# Patient Record
Sex: MALE | Race: WHITE | NOT HISPANIC OR LATINO | Employment: FULL TIME | URBAN - METROPOLITAN AREA
[De-identification: names, ages, dates, MRNs, and addresses within clinical notes are randomized per-mention and may not be internally consistent; named-entity substitution may affect disease eponyms.]

---

## 2024-07-07 ENCOUNTER — HOSPITAL ENCOUNTER (EMERGENCY)
Age: 49
Discharge: HOME | End: 2024-07-07
Attending: EMERGENCY MEDICINE

## 2024-07-07 ENCOUNTER — APPOINTMENT (OUTPATIENT)
Dept: GENERAL RADIOLOGY | Age: 49
End: 2024-07-07
Attending: EMERGENCY MEDICINE

## 2024-07-07 VITALS
WEIGHT: 169.75 LBS | RESPIRATION RATE: 20 BRPM | SYSTOLIC BLOOD PRESSURE: 128 MMHG | OXYGEN SATURATION: 97 % | DIASTOLIC BLOOD PRESSURE: 83 MMHG | TEMPERATURE: 98.2 F | HEART RATE: 61 BPM

## 2024-07-07 DIAGNOSIS — R07.9 CHEST PAIN, UNSPECIFIED TYPE: Primary | ICD-10-CM

## 2024-07-07 LAB
ALBUMIN SERPL-MCNC: 4.2 G/DL (ref 3.6–5.1)
ALBUMIN/GLOB SERPL: 1.3 {RATIO} (ref 1–2.4)
ALP SERPL-CCNC: 88 UNITS/L (ref 45–117)
ALT SERPL-CCNC: 48 UNITS/L
ANION GAP SERPL CALC-SCNC: 8 MMOL/L (ref 7–19)
AST SERPL-CCNC: 33 UNITS/L
ATRIAL RATE (BPM): 96
BASOPHILS # BLD: 0 K/MCL (ref 0–0.3)
BASOPHILS NFR BLD: 0 %
BILIRUB SERPL-MCNC: 0.8 MG/DL (ref 0.2–1)
BUN SERPL-MCNC: 10 MG/DL (ref 6–20)
BUN/CREAT SERPL: 13 (ref 7–25)
CALCIUM SERPL-MCNC: 9.6 MG/DL (ref 8.4–10.2)
CHLORIDE SERPL-SCNC: 104 MMOL/L (ref 97–110)
CO2 SERPL-SCNC: 29 MMOL/L (ref 21–32)
CREAT SERPL-MCNC: 0.75 MG/DL (ref 0.67–1.17)
D DIMER PPP FEU-MCNC: 0.36 MG/L (FEU)
DEPRECATED RDW RBC: 42 FL (ref 39–50)
EGFRCR SERPLBLD CKD-EPI 2021: >90 ML/MIN/{1.73_M2}
EOSINOPHIL # BLD: 0 K/MCL (ref 0–0.5)
EOSINOPHIL NFR BLD: 0 %
ERYTHROCYTE [DISTWIDTH] IN BLOOD: 13.2 % (ref 11–15)
FASTING DURATION TIME PATIENT: ABNORMAL H
GLOBULIN SER-MCNC: 3.3 G/DL (ref 2–4)
GLUCOSE SERPL-MCNC: 116 MG/DL (ref 70–99)
HCT VFR BLD CALC: 45.4 % (ref 39–51)
HGB BLD-MCNC: 15.9 G/DL (ref 13–17)
IMM GRANULOCYTES # BLD AUTO: 0 K/MCL (ref 0–0.2)
IMM GRANULOCYTES # BLD: 0 %
LYMPHOCYTES # BLD: 1.3 K/MCL (ref 1–4.8)
LYMPHOCYTES NFR BLD: 17 %
MAGNESIUM SERPL-MCNC: 1.9 MG/DL (ref 1.7–2.4)
MCH RBC QN AUTO: 30.5 PG (ref 26–34)
MCHC RBC AUTO-ENTMCNC: 35 G/DL (ref 32–36.5)
MCV RBC AUTO: 87.1 FL (ref 78–100)
MONOCYTES # BLD: 0.5 K/MCL (ref 0.3–0.9)
MONOCYTES NFR BLD: 7 %
NEUTROPHILS # BLD: 5.5 K/MCL (ref 1.8–7.7)
NEUTROPHILS NFR BLD: 76 %
NRBC BLD MANUAL-RTO: 0 /100 WBC
NT-PROBNP SERPL-MCNC: 71 PG/ML
P AXIS (DEGREES): 69
PLATELET # BLD AUTO: 243 K/MCL (ref 140–450)
POTASSIUM SERPL-SCNC: 4.2 MMOL/L (ref 3.4–5.1)
PR-INTERVAL (MSEC): 126
PROT SERPL-MCNC: 7.5 G/DL (ref 6.4–8.2)
QRS-INTERVAL (MSEC): 100
QT-INTERVAL (MSEC): 344
QTC: 435
R AXIS (DEGREES): 57
RBC # BLD: 5.21 MIL/MCL (ref 4.5–5.9)
REPORT TEXT: NORMAL
SODIUM SERPL-SCNC: 137 MMOL/L (ref 135–145)
T AXIS (DEGREES): -10
TROPONIN I SERPL DL<=0.01 NG/ML-MCNC: 6 NG/L
TROPONIN I SERPL DL<=0.01 NG/ML-MCNC: 7 NG/L
VENTRICULAR RATE EKG/MIN (BPM): 96
WBC # BLD: 7.3 K/MCL (ref 4.2–11)

## 2024-07-07 PROCEDURE — 83880 ASSAY OF NATRIURETIC PEPTIDE: CPT | Performed by: EMERGENCY MEDICINE

## 2024-07-07 PROCEDURE — 99285 EMERGENCY DEPT VISIT HI MDM: CPT

## 2024-07-07 PROCEDURE — 71045 X-RAY EXAM CHEST 1 VIEW: CPT

## 2024-07-07 PROCEDURE — 85379 FIBRIN DEGRADATION QUANT: CPT | Performed by: EMERGENCY MEDICINE

## 2024-07-07 PROCEDURE — 10002803 HB RX 637: Performed by: EMERGENCY MEDICINE

## 2024-07-07 PROCEDURE — 83735 ASSAY OF MAGNESIUM: CPT | Performed by: EMERGENCY MEDICINE

## 2024-07-07 PROCEDURE — 36415 COLL VENOUS BLD VENIPUNCTURE: CPT

## 2024-07-07 PROCEDURE — 93005 ELECTROCARDIOGRAM TRACING: CPT | Performed by: EMERGENCY MEDICINE

## 2024-07-07 PROCEDURE — 93010 ELECTROCARDIOGRAM REPORT: CPT | Performed by: INTERNAL MEDICINE

## 2024-07-07 PROCEDURE — 85025 COMPLETE CBC W/AUTO DIFF WBC: CPT | Performed by: EMERGENCY MEDICINE

## 2024-07-07 PROCEDURE — 10002807 HB RX 258: Performed by: EMERGENCY MEDICINE

## 2024-07-07 PROCEDURE — 84484 ASSAY OF TROPONIN QUANT: CPT | Performed by: EMERGENCY MEDICINE

## 2024-07-07 PROCEDURE — 80053 COMPREHEN METABOLIC PANEL: CPT | Performed by: EMERGENCY MEDICINE

## 2024-07-07 RX ORDER — DIAZEPAM 5 MG/1
5 TABLET ORAL ONCE
Status: COMPLETED | OUTPATIENT
Start: 2024-07-07 | End: 2024-07-07

## 2024-07-07 RX ADMIN — DIAZEPAM 5 MG: 5 TABLET ORAL at 16:44

## 2024-07-07 RX ADMIN — SODIUM CHLORIDE 1000 ML: 9 INJECTION, SOLUTION INTRAVENOUS at 16:50

## 2024-07-07 ASSESSMENT — PAIN SCALES - GENERAL: PAINLEVEL_OUTOF10: 2

## 2024-07-07 ASSESSMENT — PAIN DESCRIPTION - PAIN TYPE: TYPE: CHEST PAIN

## 2024-07-07 ASSESSMENT — HEART SCORE
HEART SCORE: 2
HISTORY: SLIGHTLY SUSPICIOUS
TROPONIN: EQUAL OR LESS THAN NORMAL LIMIT
AGE: GREATER THAN 45 TO LESS THAN 65
EKG: NORMAL
RISK FACTORS: 1-2 RISK FACTORS

## 2024-07-08 LAB
RAINBOW EXTRA TUBES HOLD SPECIMEN: NORMAL

## 2024-07-10 ENCOUNTER — HOSPITAL ENCOUNTER (EMERGENCY)
Facility: HOSPITAL | Age: 49
Discharge: HOME | End: 2024-07-10
Attending: EMERGENCY MEDICINE
Payer: COMMERCIAL

## 2024-07-10 VITALS
HEART RATE: 65 BPM | BODY MASS INDEX: 22.35 KG/M2 | OXYGEN SATURATION: 97 % | DIASTOLIC BLOOD PRESSURE: 81 MMHG | WEIGHT: 165 LBS | TEMPERATURE: 98 F | HEIGHT: 72 IN | RESPIRATION RATE: 20 BRPM | SYSTOLIC BLOOD PRESSURE: 111 MMHG

## 2024-07-10 DIAGNOSIS — R00.2 PALPITATIONS: Primary | ICD-10-CM

## 2024-07-10 DIAGNOSIS — R07.9 CHEST PAIN OF UNCERTAIN ETIOLOGY: ICD-10-CM

## 2024-07-10 LAB
ALBUMIN SERPL-MCNC: 4.8 G/DL (ref 3.2–4.8)
ALBUMIN/GLOB SERPL: 2.1 {RATIO} (ref 1–2)
ALP LIVER SERPL-CCNC: 82 U/L
ALT SERPL-CCNC: 40 U/L
AMPHET UR QL SCN: NEGATIVE
ANION GAP SERPL CALC-SCNC: 8 MMOL/L (ref 0–18)
AST SERPL-CCNC: 30 U/L (ref ?–34)
ATRIAL RATE: 75 BPM
BARBITURATES UR QL SCN: NEGATIVE
BASOPHILS # BLD AUTO: 0.04 X10(3) UL (ref 0–0.2)
BASOPHILS NFR BLD AUTO: 1.1 %
BENZODIAZ UR QL SCN: NEGATIVE
BILIRUB SERPL-MCNC: 1.3 MG/DL (ref 0.3–1.2)
BUN BLD-MCNC: 13 MG/DL (ref 9–23)
BUN/CREAT SERPL: 14 (ref 10–20)
CALCIUM BLD-MCNC: 9.7 MG/DL (ref 8.7–10.4)
CANNABINOIDS UR QL SCN: NEGATIVE
CHLORIDE SERPL-SCNC: 103 MMOL/L (ref 98–112)
CO2 SERPL-SCNC: 27 MMOL/L (ref 21–32)
COCAINE UR QL: NEGATIVE
CREAT BLD-MCNC: 0.93 MG/DL
CREAT UR-SCNC: 12 MG/DL
D DIMER PPP FEU-MCNC: <0.27 UG/ML FEU (ref ?–0.5)
DEPRECATED RDW RBC AUTO: 40.4 FL (ref 35.1–46.3)
EGFRCR SERPLBLD CKD-EPI 2021: 100 ML/MIN/1.73M2 (ref 60–?)
EOSINOPHIL # BLD AUTO: 0.06 X10(3) UL (ref 0–0.7)
EOSINOPHIL NFR BLD AUTO: 1.6 %
ERYTHROCYTE [DISTWIDTH] IN BLOOD BY AUTOMATED COUNT: 12.5 % (ref 11–15)
ETHANOL SERPL-MCNC: <3 MG/DL (ref ?–3)
FENTANYL UR QL SCN: NEGATIVE
GLOBULIN PLAS-MCNC: 2.3 G/DL (ref 2–3.5)
GLUCOSE BLD-MCNC: 102 MG/DL (ref 70–99)
HCT VFR BLD AUTO: 46.7 %
HGB BLD-MCNC: 16.4 G/DL
IMM GRANULOCYTES # BLD AUTO: 0.02 X10(3) UL (ref 0–1)
IMM GRANULOCYTES NFR BLD: 0.5 %
LIPASE SERPL-CCNC: 44 U/L (ref 13–75)
LYMPHOCYTES # BLD AUTO: 1.15 X10(3) UL (ref 1–4)
LYMPHOCYTES NFR BLD AUTO: 30.3 %
MAGNESIUM SERPL-MCNC: 2 MG/DL (ref 1.6–2.6)
MCH RBC QN AUTO: 30.9 PG (ref 26–34)
MCHC RBC AUTO-ENTMCNC: 35.1 G/DL (ref 31–37)
MCV RBC AUTO: 87.9 FL
MDMA UR QL SCN: NEGATIVE
METHADONE UR QL SCN: NEGATIVE
MONOCYTES # BLD AUTO: 0.38 X10(3) UL (ref 0.1–1)
MONOCYTES NFR BLD AUTO: 10 %
NEUTROPHILS # BLD AUTO: 2.15 X10 (3) UL (ref 1.5–7.7)
NEUTROPHILS # BLD AUTO: 2.15 X10(3) UL (ref 1.5–7.7)
NEUTROPHILS NFR BLD AUTO: 56.5 %
OPIATES UR QL SCN: NEGATIVE
OSMOLALITY SERPL CALC.SUM OF ELEC: 286 MOSM/KG (ref 275–295)
OXYCODONE UR QL SCN: NEGATIVE
P AXIS: 74 DEGREES
P-R INTERVAL: 134 MS
PCP UR QL SCN: NEGATIVE
PLATELET # BLD AUTO: 227 10(3)UL (ref 150–450)
POTASSIUM SERPL-SCNC: 4 MMOL/L (ref 3.5–5.1)
PROT SERPL-MCNC: 7.1 G/DL (ref 5.7–8.2)
Q-T INTERVAL: 364 MS
QRS DURATION: 94 MS
QTC CALCULATION (BEZET): 406 MS
R AXIS: 68 DEGREES
RBC # BLD AUTO: 5.31 X10(6)UL
SODIUM SERPL-SCNC: 138 MMOL/L (ref 136–145)
T AXIS: 50 DEGREES
TROPONIN I SERPL HS-MCNC: 3 NG/L
TROPONIN I SERPL HS-MCNC: 4 NG/L
TSI SER-ACNC: 0.75 MIU/ML (ref 0.55–4.78)
VENTRICULAR RATE: 75 BPM
WBC # BLD AUTO: 3.8 X10(3) UL (ref 4–11)

## 2024-07-10 PROCEDURE — 96360 HYDRATION IV INFUSION INIT: CPT

## 2024-07-10 PROCEDURE — 99284 EMERGENCY DEPT VISIT MOD MDM: CPT

## 2024-07-10 PROCEDURE — 85025 COMPLETE CBC W/AUTO DIFF WBC: CPT | Performed by: EMERGENCY MEDICINE

## 2024-07-10 PROCEDURE — 82077 ASSAY SPEC XCP UR&BREATH IA: CPT | Performed by: EMERGENCY MEDICINE

## 2024-07-10 PROCEDURE — 84484 ASSAY OF TROPONIN QUANT: CPT | Performed by: EMERGENCY MEDICINE

## 2024-07-10 PROCEDURE — 83690 ASSAY OF LIPASE: CPT | Performed by: EMERGENCY MEDICINE

## 2024-07-10 PROCEDURE — 85379 FIBRIN DEGRADATION QUANT: CPT | Performed by: EMERGENCY MEDICINE

## 2024-07-10 PROCEDURE — 99285 EMERGENCY DEPT VISIT HI MDM: CPT

## 2024-07-10 PROCEDURE — 83735 ASSAY OF MAGNESIUM: CPT | Performed by: EMERGENCY MEDICINE

## 2024-07-10 PROCEDURE — 93010 ELECTROCARDIOGRAM REPORT: CPT

## 2024-07-10 PROCEDURE — 93005 ELECTROCARDIOGRAM TRACING: CPT

## 2024-07-10 PROCEDURE — 96361 HYDRATE IV INFUSION ADD-ON: CPT

## 2024-07-10 PROCEDURE — 80307 DRUG TEST PRSMV CHEM ANLYZR: CPT | Performed by: EMERGENCY MEDICINE

## 2024-07-10 PROCEDURE — 80053 COMPREHEN METABOLIC PANEL: CPT | Performed by: EMERGENCY MEDICINE

## 2024-07-10 PROCEDURE — 84443 ASSAY THYROID STIM HORMONE: CPT | Performed by: EMERGENCY MEDICINE

## 2024-07-10 RX ORDER — LORAZEPAM 1 MG/1
1 TABLET ORAL ONCE
Status: COMPLETED | OUTPATIENT
Start: 2024-07-10 | End: 2024-07-10

## 2024-07-10 NOTE — ED INITIAL ASSESSMENT (HPI)
Pt here via EMS for SOB. Pt stated he was going through his normal routine at work when he became SOB, his heart started pounding., and he felt clammy. His mouth was dry so he tried to drink some water and sit down but symptoms did not get better. Pt's brother called EMS. Per EMS, when they arrived pt was still feeling SOB and felt his heart racing. EKG normal.  Per pt, he was seen at a hospital on Sunday for the same symptoms. Had a normal EKG and CXR.

## 2024-07-10 NOTE — ED PROVIDER NOTES
Patient Seen in: [facility] Emergency Department      History     Chief Complaint   Patient presents with    Difficulty Breathing     Stated Complaint:     Subjective:   HPI    48 year old male with no known past medical history who presents with difficulty breathing, palpitations and intermittent throbbing to his chest for the past couple days.  Patient states that he was recently in [place] on vacation, feeling well and no issues then on driving home started to feel his heart racing and felt a throbbing especially to the right side of his chest, felt diaphoretic and like he would pass out.  He went to [facility] where he had a full workup with no acute findings and symptoms subsided.  Patient states since that time he has not drank any alcohol, has been trying to hydrate to see if he can help his symptoms.  He also had a negative home COVID test.  This morning he was going to work and again felt the symptoms heart racing, right-sided chest pounding, clammy hands and feeling near syncopal.  Patient states now the symptoms have subsided but for no known specific reason.  He was not having any exertional symptoms prior to this and symptoms are onset any time regardless of activity.  Patient admits to 2 alcoholic drinks per night on a regular basis, states he has never had withdrawal symptoms.  While he was in [place] he was having closer to 4-5 drinks per night for couple days but states he still felt well when he woke up other than feeling slightly dehydrated.  Hedenies any fever/chills, cough, congestion, history of dysrhythmia, pain on deep breathing, or acute leg swelling.    Objective:   History reviewed. No pertinent past medical history.           History reviewed. No pertinent surgical history.             Social History     Socioeconomic History    Marital status:    Tobacco Use    Smoking status: Never    Smokeless tobacco: Never   Vaping Use    Vaping status: Never Used    Substance and Sexual Activity    Alcohol use: Yes     Comment: 2 drinks nightly              Review of Systems    Positive for stated Chief Complaint: Difficulty Breathing    Other systems are as noted in HPI.  Constitutional and vital signs reviewed.      All other systems reviewed and negative except as noted above.    Physical Exam     ED Triage Vitals   BP 07/10/24 0841 131/79   Pulse 07/10/24 0841 74   Resp 07/10/24 0841 20   Temp 07/10/24 0831 98.3 °F (36.8 °C)   Temp src 07/10/24 0831 Temporal   SpO2 07/10/24 0841 100 %   O2 Device 07/10/24 0841 None (Room air)       Current Vitals:   Vital Signs  BP: 111/81  Pulse: 65  Resp: 20  Temp: 98.3 °F (36.8 °C)  Temp src: Temporal  MAP (mmHg): 92    Oxygen Therapy  SpO2: 97 %  O2 Device: None (Room air)            Physical Exam  Vitals and nursing note reviewed.   Constitutional:       General: He is not in acute distress.     Appearance: Normal appearance. He is well-developed. He is not ill-appearing, toxic-appearing or diaphoretic.   HENT:      Head: Normocephalic and atraumatic.   Eyes:      Conjunctiva/sclera: Conjunctivae normal.      Pupils: Pupils are equal, round, and reactive to light.   Cardiovascular:      Rate and Rhythm: Normal rate and regular rhythm.      Pulses: Normal pulses.      Heart sounds: Normal heart sounds. No murmur heard.  Pulmonary:      Effort: Pulmonary effort is normal. No respiratory distress.      Breath sounds: Normal breath sounds. No wheezing.   Abdominal:      General: There is no distension.      Palpations: Abdomen is soft.      Tenderness: There is no abdominal tenderness. There is no guarding.   Musculoskeletal:         General: No tenderness. Normal range of motion.      Cervical back: Full passive range of motion without pain, normal range of motion and neck supple. No rigidity. Normal range of motion.      Right lower leg: No edema.      Left lower leg: No edema.   Skin:     General: Skin is warm and dry.      Findings:  No rash.   Neurological:      Mental Status: He is alert and oriented to person, place, and time.      GCS: GCS eye subscore is 4. GCS verbal subscore is 5. GCS motor subscore is 6.      Sensory: Sensation is intact. No sensory deficit.      Motor: Motor function is intact. No weakness.   Psychiatric:         Attention and Perception: Attention normal.         Mood and Affect: Mood normal.         Behavior: Behavior normal. Behavior is cooperative.               ED Course     Labs Reviewed   COMP METABOLIC PANEL (14) - Abnormal; Notable for the following components:       Result Value    Glucose 102 (*)     Bilirubin, Total 1.3 (*)     A/G Ratio 2.1 (*)     All other components within normal limits   CBC W/ DIFFERENTIAL - Abnormal; Notable for the following components:    WBC 3.8 (*)     All other components within normal limits   MAGNESIUM - Normal   ETHYL ALCOHOL - Normal   TROPONIN I HIGH SENSITIVITY - Normal   LIPASE - Normal   D-DIMER - Normal   TSH W REFLEX TO FREE T4 - Normal   TROPONIN I HIGH SENSITIVITY - Normal   CBC WITH DIFFERENTIAL WITH PLATELET    Narrative:     The following orders were created for panel order CBC With Differential With Platelet.  Procedure                               Abnormality         Status                     ---------                               -----------         ------                     CBC W/ DIFFERENTIAL[[id number]]          Abnormal            Final result                 Please view results for these tests on the individual orders.   DRUG ABUSE PANEL 11 SCREEN   RAINBOW DRAW LAVENDER   RAINBOW DRAW LIGHT GREEN   RAINBOW DRAW BLUE     EKG    Rate, intervals and axes as noted on EKG Report.  Rate: 75  Rhythm: Sinus Rhythm  Reading: Sinus rhythm with occasional sinus arrhythmia.                    MDM      Pulse Ox: 97%, Normal, room air    Cardiac Monitor:   Pulse Readings from Last 1 Encounters:   07/10/24 65   , sinus, normal for rate and rhythm, no ectopic beats  noted    Prior radiology reviewed: yes -XR chest 7/7 from outpatient hospital reviewed -no acute cardiopulmonary abnormality.      This is a patient who describes intermittent bouts of palpitations that will come on spontaneously without exertion and cause diaphoresis and near syncope, a right-sided chest pain when they occur but then when palpitations stop all of his symptoms will dissipate.  Here he feels well his vitals are stable we had no dysrhythmia.  Troponin is negative x 2 as well as D-dimer and electrolytes stable.  I called [facility], for possible outpatient Holter monitor.  They state they will have to see him in the office first but will try to get him an appointment and call him with the appointment that is next available.  Patient stable, advised of reasons to return he will also try to get in with his primary care provider.    Medications   LORazepam (Ativan) tab 1 mg (1 mg Oral Given 7/10/24 5580)   sodium chloride 0.9 % IV bolus 1,000 mL (0 mL Intravenous Stopped 7/10/24 8786)         Disposition and Plan     Clinical Impression:  1. Palpitations    2. Chest pain of uncertain etiology         Disposition:  Discharge  7/10/2024 12:04 pm    Follow-up:  [facility]  [address], [address]  [place] [address] [phone]  Schedule an appointment as soon as possible for a visit  Call for next available appointment, they should call you as well          Medications Prescribed:  There are no discharge medications for this patient.

## 2024-07-10 NOTE — ED QUICK NOTES
Per pt, ER MD at hospital on Sunday stated the symptoms were most likely from ETOH withdrawal. Pt had multiple drinks Saturday night ~7pm and started feeling symptoms ~930am Sunday. Pt has not had a drink since Saturday. Monday he did not feel too well and Tuesday he felt fatigued.